# Patient Record
Sex: MALE | Race: WHITE | NOT HISPANIC OR LATINO | Employment: FULL TIME | URBAN - METROPOLITAN AREA
[De-identification: names, ages, dates, MRNs, and addresses within clinical notes are randomized per-mention and may not be internally consistent; named-entity substitution may affect disease eponyms.]

---

## 2022-10-20 ENCOUNTER — HOSPITAL ENCOUNTER (EMERGENCY)
Facility: CLINIC | Age: 22
Discharge: HOME | End: 2022-10-20
Attending: NURSE PRACTITIONER | Admitting: NURSE PRACTITIONER
Payer: OTHER MISCELLANEOUS

## 2022-10-20 ENCOUNTER — APPOINTMENT (OUTPATIENT)
Dept: GENERAL RADIOLOGY | Facility: CLINIC | Age: 22
End: 2022-10-20
Attending: NURSE PRACTITIONER
Payer: OTHER MISCELLANEOUS

## 2022-10-20 VITALS
DIASTOLIC BLOOD PRESSURE: 74 MMHG | OXYGEN SATURATION: 98 % | HEART RATE: 74 BPM | TEMPERATURE: 99.1 F | RESPIRATION RATE: 18 BRPM | SYSTOLIC BLOOD PRESSURE: 122 MMHG

## 2022-10-20 DIAGNOSIS — S82.832A TRAUMATIC CLOSED NONDISPLACED FRACTURE OF DISTAL FIBULA, LEFT, INITIAL ENCOUNTER: ICD-10-CM

## 2022-10-20 PROCEDURE — G0463 HOSPITAL OUTPT CLINIC VISIT: HCPCS | Mod: 25 | Performed by: NURSE PRACTITIONER

## 2022-10-20 PROCEDURE — G0463 HOSPITAL OUTPT CLINIC VISIT: HCPCS | Performed by: NURSE PRACTITIONER

## 2022-10-20 PROCEDURE — 99204 OFFICE O/P NEW MOD 45 MIN: CPT | Mod: 25 | Performed by: NURSE PRACTITIONER

## 2022-10-20 PROCEDURE — 27786 TREATMENT OF ANKLE FRACTURE: CPT | Mod: 54 | Performed by: NURSE PRACTITIONER

## 2022-10-20 PROCEDURE — 27786 TREATMENT OF ANKLE FRACTURE: CPT | Mod: LT | Performed by: NURSE PRACTITIONER

## 2022-10-20 PROCEDURE — 73610 X-RAY EXAM OF ANKLE: CPT | Mod: LT

## 2022-10-20 ASSESSMENT — ACTIVITIES OF DAILY LIVING (ADL): ADLS_ACUITY_SCORE: 35
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
## 2022-10-20 NOTE — Clinical Note
Dwayne Dumont was seen and treated in our emergency department on 10/20/2022.  He may return to work on 10/29/2022.  Patient needs to wear his cam walker boot at all times except for showers.  Patient needs to use crutches to be nonweightbearing to his left leg.  Patient needs to follow-up with orthopedics within 10 days for reevaluation.  Patient able to work as long as he can wear his cam walker boot and use crutches for ambulation.  If unable to meet these requirements patient is unable to work.     If you have any questions or concerns, please don't hesitate to call.      Nelly Ely, JER CNP

## 2022-10-20 NOTE — DISCHARGE INSTRUCTIONS
You should be non weightbearing until you follow-up with orthopedics.  You should wear the cam walker boot at all times except for when showering.  When resting you should elevate.  You may work as long as you can use your crutches, be nonweightbearing and use the cam walker boot but otherwise you are unable to work.  You may take Tylenol 1000 mg by mouth 3 times daily with ibuprofen 600 mg 3 times daily for pain.  Use ice as needed for discomfort.  You need to follow-up with orthopedics.

## 2022-10-20 NOTE — ED TRIAGE NOTES
Pt reports left ankle pain from work, stepped off a 3 foot stage at work causing ankle to roll on 10/14/22.     pt took ibuprofen 400 mg at 0830

## 2022-10-26 ENCOUNTER — OFFICE VISIT (OUTPATIENT)
Dept: PODIATRY | Facility: CLINIC | Age: 22
End: 2022-10-26
Payer: OTHER MISCELLANEOUS

## 2022-10-26 VITALS
HEIGHT: 66 IN | DIASTOLIC BLOOD PRESSURE: 79 MMHG | HEART RATE: 96 BPM | WEIGHT: 121 LBS | SYSTOLIC BLOOD PRESSURE: 113 MMHG | BODY MASS INDEX: 19.44 KG/M2

## 2022-10-26 DIAGNOSIS — S82.832A TRAUMATIC CLOSED NONDISPLACED FRACTURE OF DISTAL FIBULA, LEFT, INITIAL ENCOUNTER: Primary | ICD-10-CM

## 2022-10-26 PROCEDURE — 99203 OFFICE O/P NEW LOW 30 MIN: CPT | Performed by: PODIATRIST

## 2022-10-26 ASSESSMENT — PAIN SCALES - GENERAL: PAINLEVEL: MODERATE PAIN (5)

## 2022-10-26 NOTE — LETTER
October 26, 2022      Dwayne Dumont  [address] [facility] [address]        To Whom It May Concern:    Dwayne Dumont was seen in our clinic. He may return to work with the following: limited to light duty - standing limited to 0 hrs, walking limited to 0 hrs and sitting only on or about 10/26/22 thru 11/28/22.  The patient will be reevaluated at that point.      Sincerely,        Garrett Seo DPM

## 2022-10-26 NOTE — PATIENT INSTRUCTIONS
Caring for your injured foot or ankle    1-3 days after injury  Rest and immobilize the injured foot or ankle.  Ice injured area 20/hour during the day if possible.  Compression, such as an ace wrap, will help reduce swelling.  Elevate the injured foot/ankle as much as possible  4 days to 4 weeks after injury  Contrast soaking  Get two 5-gallon buckets, one ice water the other warm water  Start soaking the injured foot/ankle in the ice water for up to 5 minutes then switch to the warm water for 5 minutes  Repeat this once then finish with one more soak in the ice water  Perform simple range of motion exercises while soaking  Massage the injured tissue gently with a topical muscle rub  Weight-bear as tolerated depending upon pain (this is not the type of pain that you should push through)  4+ weeks from injury  Increase activity as tolerated  Wear supportive shoes to  offload the tension forces and prevent future tissue damage.    Please notify the office if at any point there is increased pain, swelling or redness.        Flu vaccines are now available at all [facility] clinics and retail pharmacies across the [facility]. Appointments are required for clinic locations. To schedule an appointment online, please log into [employer] or create an account if you are a new user. You can also call [phone], or simply walk in at one of the [facility] retail pharmacy locations.      [employer] - Login Page

## 2022-10-26 NOTE — NURSING NOTE
"Chief Complaint   Patient presents with     Left Ankle - Consult     Work comp injury       Initial /79   Pulse 96   Ht 1.683 m (5' 6.25\")   Wt 54.9 kg (121 lb)   BMI 19.38 kg/m   Estimated body mass index is 19.38 kg/m  as calculated from the following:    Height as of this encounter: 1.683 m (5' 6.25\").    Weight as of this encounter: 54.9 kg (121 lb).  Medications and allergies reviewed.      Suad MARKS MA    "
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
## 2022-12-21 ENCOUNTER — OFFICE VISIT (OUTPATIENT)
Dept: PODIATRY | Facility: CLINIC | Age: 22
End: 2022-12-21
Payer: OTHER MISCELLANEOUS

## 2022-12-21 ENCOUNTER — ANCILLARY PROCEDURE (OUTPATIENT)
Dept: GENERAL RADIOLOGY | Facility: CLINIC | Age: 22
End: 2022-12-21
Attending: PODIATRIST

## 2022-12-21 VITALS
HEIGHT: 66 IN | SYSTOLIC BLOOD PRESSURE: 133 MMHG | WEIGHT: 121 LBS | DIASTOLIC BLOOD PRESSURE: 74 MMHG | BODY MASS INDEX: 19.44 KG/M2 | HEART RATE: 90 BPM

## 2022-12-21 DIAGNOSIS — S82.832D TRAUMATIC CLOSED NONDISPLACED FRACTURE OF DISTAL FIBULA, LEFT, WITH ROUTINE HEALING, SUBSEQUENT ENCOUNTER: Primary | ICD-10-CM

## 2022-12-21 PROCEDURE — 73610 X-RAY EXAM OF ANKLE: CPT | Mod: TC | Performed by: RADIOLOGY

## 2022-12-21 PROCEDURE — 99213 OFFICE O/P EST LOW 20 MIN: CPT | Performed by: PODIATRIST

## 2022-12-21 ASSESSMENT — PAIN SCALES - GENERAL: PAINLEVEL: MODERATE PAIN (4)

## 2022-12-21 NOTE — PROGRESS NOTES
"Dwayne returns to the office for reevaluation of the left ankle.  The patient relates following the instructions given at the last visit with noted overall less pain and more improvement in function of the left ankle.   The patient relates no other problems.    Vitals: /74   Pulse 90   Ht 1.683 m (5' 6.25\")   Wt 54.9 kg (121 lb)   BMI 19.38 kg/m    BMI= Body mass index is 19.38 kg/m .    Lower Extremity Physical Exam:      Neurovascular status remains unchanged.  Muscular exam is within normal limits to major muscle groups.  Integument is intact.      Noted decreased pain on palpation over the distal aspect of the lateral malleolus on the left.  No surrounding erythema or edema noted.  Normal ankle range of motion noted.    Diagnostics:  Radiograph evaluation including three views of the left ankle reveals interval healing with increased trabeculation of the distal avulsion fracture fragment.  I personally evaluated the images as well as reviewed the images with the patient pointing out the findings.      Assessment:     ICD-10-CM    1. Traumatic closed nondisplaced fracture of distal fibula, left, with routine healing, subsequent encounter  S82.832D XR Ankle Left G/E 3 Views     Ankle/Foot Bracing Supplies DME          Plan:    I have explained to Dwayne about the progress of the conditions.  At this time, the patient was educated on the importance of offloading supportive shoes and other devices.  I demonstrated to the patient calf stretches to perform every hour daily until symptoms resolve.  After symptoms resolve, the patient was advised to perform the stretches 3 times daily to prevent future recurrence.  The patient was instructed to perform warm soaks with Epson salt after which to also apply over-the-counter Voltaren gel to deeply massage the injured tissue.  The patient was instructed to do this on a daily basis until symptoms resolve.  The patient was fitted with a Trilok ankle brace that will aid in " offloading the tension forces to the soft tissues and prevent further inflammation.   The patient will return in four weeks for reevaluation to determine if any further treatment will be needed.    Dwayne verbalized agreement with and understanding of the rational for the diagnosis and treatment plan.  All questions were answered to best of my ability and the patient's satisfaction. The patient was advised to contact the clinic with any questions that may arise after the clinic visit.      Disclaimer: This note consists of symbols derived from keyboarding, dictation and/or voice recognition software. As a result, there may be errors in the script that have gone undetected. Please consider this when interpreting information found in this chart.       DAPHNIE Seo D.P.M., F.A.C.F.A.S.

## 2022-12-21 NOTE — NURSING NOTE
"Chief Complaint   Patient presents with     RECHECK     Left ankle- still having soreness but feeling better       Initial /74   Pulse 90   Ht 1.683 m (5' 6.25\")   Wt 54.9 kg (121 lb)   BMI 19.38 kg/m   Estimated body mass index is 19.38 kg/m  as calculated from the following:    Height as of this encounter: 1.683 m (5' 6.25\").    Weight as of this encounter: 54.9 kg (121 lb).  Medications and allergies reviewed.      Suad MARKS MA    "

## 2022-12-21 NOTE — LETTER
December 21, 2022      Dwayne Dumont  [address] [facility] [address]        To Whom It May Concern:    Dwayne Dumont was seen in our clinic. He may return to work with the following: limited to light duty - lifting no greater than 40 pounds, no climbing, standing limited to 8 hrs and walking limited to 8 hrs on or about 12/21/2022 for 1 month.      Sincerely,        Garrett Seo DPM

## 2022-12-21 NOTE — LETTER
"    12/21/2022         RE: Dwayne Dumont  [address] [facility] [address]        Dear Colleague,    Thank you for referring your patient, Dwayne Dumont, to the [facility]. Please see a copy of my visit note below.    Dwayne returns to the office for reevaluation of the left ankle.  The patient relates following the instructions given at the last visit with noted overall less pain and more improvement in function of the left ankle.   The patient relates no other problems.    Vitals: /74   Pulse 90   Ht 1.683 m (5' 6.25\")   Wt 54.9 kg (121 lb)   BMI 19.38 kg/m    BMI= Body mass index is 19.38 kg/m .    Lower Extremity Physical Exam:      Neurovascular status remains unchanged.  Muscular exam is within normal limits to major muscle groups.  Integument is intact.      Noted decreased pain on palpation over the distal aspect of the lateral malleolus on the left.  No surrounding erythema or edema noted.  Normal ankle range of motion noted.    Diagnostics:  Radiograph evaluation including three views of the left ankle reveals interval healing with increased trabeculation of the distal avulsion fracture fragment.  I personally evaluated the images as well as reviewed the images with the patient pointing out the findings.      Assessment:     ICD-10-CM    1. Traumatic closed nondisplaced fracture of distal fibula, left, with routine healing, subsequent encounter  S82.832D XR Ankle Left G/E 3 Views     Ankle/Foot Bracing Supplies DME          Plan:    I have explained to Dwayne about the progress of the conditions.  At this time, the patient was educated on the importance of offloading supportive shoes and other devices.  I demonstrated to the patient calf stretches to perform every hour daily until symptoms resolve.  After symptoms resolve, the patient was advised to perform the stretches 3 times daily to prevent future recurrence.  The patient was instructed to perform warm soaks " with Epson salt after which to also apply over-the-counter Voltaren gel to deeply massage the injured tissue.  The patient was instructed to do this on a daily basis until symptoms resolve.  The patient was fitted with a Trilok ankle brace that will aid in offloading the tension forces to the soft tissues and prevent further inflammation.   The patient will return in four weeks for reevaluation to determine if any further treatment will be needed.    Dwayne verbalized agreement with and understanding of the rational for the diagnosis and treatment plan.  All questions were answered to best of my ability and the patient's satisfaction. The patient was advised to contact the clinic with any questions that may arise after the clinic visit.      Disclaimer: This note consists of symbols derived from keyboarding, dictation and/or voice recognition software. As a result, there may be errors in the script that have gone undetected. Please consider this when interpreting information found in this chart.       DAPHNIE Seo D.P.M., F.A.C.F.A.S.        Again, thank you for allowing me to participate in the care of your patient.        Sincerely,        Garrett Seo DPM

## 2022-12-21 NOTE — PATIENT INSTRUCTIONS
Next Steps:      Support:  Wear supportive shoes, sandals, boots and/or inserts that have a rigid supportive sole.    This will offload the majority of tension forces that travel through your feet every step you take.    Skechers Max Cushioning Elite/Premier   Skechers Relax Fit D'Lux Walker  Reebok Walk Ultra 7 DMX MAX   Hoka Bondi walking shoes  [employer] shoes/slippers ([email or web address])  [employer] sandals (https://www.Vivo/us)  Superfeet inserts (www.superfeet.com)  It is important that you also wear supportive shoe wear in the house to continue providing support to your feet.    You may always use a cushioned liner for your shoes if that makes your feet feel better.  Stretching  Calf stretching is essential to offload the tension forces that travel through your feet every step you take  Preferred calf stretch is the Runner's Stretch  Place one foot behind the other foot, flat against the ground (it is important to keep the heel on the ground).  The back leg is the one that will be stretched.  Start with the knee straight and lean your hips into the wall, counter or whatever you are leaning into - count to ten.  Next, bend the knee.  You should feel the stretch lower in the calf muscle - count to ten.  Repeat this stretch once an hour to start off with.  When symptoms subside, I recommend performing the stretch 3 times daily to prevent any future problems.                Tissue Massage  It is important that you physically loosen the inflammation tissue to help your body heal the injured tissue.  I recommend soaking your foot in warm water to increase the microcirculation to the soft tissues.  You may add Epson salt to the water if you prefer.  You may apply an over-the-counter muscle rub, such as Voltaren gel, and deeply massage the injured tissue.  Reduce Inflammation  You can ice the injured tissue with an ice pack with a light cloth covering or soaking in ice water 20 minutes to reduce  any acute inflammation, typically at the end of the day.      It is important to understand that most problems that develop in the foot and ankle are caused by excessive tension that cause microinjury to the soft tissues and inflammation in the foot and ankle.  By addressing the underlying causes with support and stretching as well as treating the current inflammatory conditions with tissue massage and anti-inflammatory treatments, most foot and ankle musculoskeletal conditions will resolve.  This may take time to heal.  However, if symptoms persist past 4 weeks you should return to the office for reevaluation to determine further treatment options.    SMOKING CESSATION  What's in cigarette smoke? - Cigarette smoke contains over 4,000 chemicals. Nicotine is one of the main ingredients which is an insecticide/herbicide. It is poisonous to our nervous system, increases blood clotting risk, and decreases the body's defenses to fight off infection. Another chemical is Carbon Monoxide is an asphyxiating gas that permanently binds to blood cells and blocks the transport of oxygen. This leads to tissue death and decreases your metabolism. Tar is a chemical that coats your lungs and trachea which impairs new oxygen coming in and carbon dioxide getting out of your body.   How does smoking impact surgery? - Smoking is particularly hazardous with regards to surgery. Surgery puts stress on the body and a smoker's body is already under strain from these chemicals. Putting the two together, especially for an elective surgery, could be a recipe for disaster. Smoking before and after surgery increases your risk of heart problems, slow wound healing, delayed bone healing, blood clots, wound infection and anesthesia complications.   What are the benefits of quitting? - In 20 minutes your blood pressure will drop back down to normal. In 8 hours the carbon monoxide (a toxic gas) levels in your blood stream will drop by half, and oxygen  levels will return to normal. In 48 hours your chance of having a heart attack will have decreased. All nicotine will have left your body. Your sense of taste and smell will return to a normal level. In 72 hours your bronchial tubes will relax, and your energy levels will increase. In 2 weeks your circulation will increase, and it will continue to improve for the next 10 weeks.    Recommendations for elective surgery - Ideally, patients should quit smoking 8 weeks before and at least 2 weeks after elective surgery in order to avoid complications. Simply cutting back on the amount of cigarettes smoked per day does not offer any benefit or decrease the risk of poor wound healing, heart problems, and infection. Smokers should also start taking Vitamin C and B for two weeks before surgery and two weeks after surgery.    Ways to Stop Smokin. Nicotine patches, lozenges, or gum  2. Support Groups  3. Medications (see below)    List of Medications:  1. Varenicline Tartrate (CHANTIX) (may be discontinued by FDA as of 2021)  2. Bupropion HCL (WELLBUTRIN, ZYBAN) - note: make sure Wellbutrin is for smoking cessation and not other issues   3. Nicotine Patch (NICODERM)   4. Nicotine Inhaler (NICOTROL)   5. Nicotine Gum Nicotine Polacrilex   6. Nicotine Lozenge: Nicotine Polacrilex (COMMIT)   * [place] offers a smoking support group as well!  Please visit: [email or web address]  If you are interested in these, ask about getting a prescription or talk to your primary care doctor about what may be the best way for you to quit.

## 2022-12-24 ENCOUNTER — HEALTH MAINTENANCE LETTER (OUTPATIENT)
Age: 22
End: 2022-12-24

## 2024-02-03 ENCOUNTER — HEALTH MAINTENANCE LETTER (OUTPATIENT)
Age: 24
End: 2024-02-03

## 2025-03-02 ENCOUNTER — HEALTH MAINTENANCE LETTER (OUTPATIENT)
Age: 25
End: 2025-03-02